# Patient Record
Sex: FEMALE | Race: OTHER | HISPANIC OR LATINO | URBAN - METROPOLITAN AREA
[De-identification: names, ages, dates, MRNs, and addresses within clinical notes are randomized per-mention and may not be internally consistent; named-entity substitution may affect disease eponyms.]

---

## 2019-08-14 ENCOUNTER — EMERGENCY (EMERGENCY)
Facility: HOSPITAL | Age: 39
LOS: 1 days | Discharge: HOME | End: 2019-08-14
Admitting: EMERGENCY MEDICINE
Payer: MEDICAID

## 2019-08-14 VITALS
DIASTOLIC BLOOD PRESSURE: 82 MMHG | RESPIRATION RATE: 16 BRPM | OXYGEN SATURATION: 100 % | SYSTOLIC BLOOD PRESSURE: 113 MMHG | HEART RATE: 81 BPM | TEMPERATURE: 98 F

## 2019-08-14 LAB
ALBUMIN SERPL ELPH-MCNC: 4.4 G/DL — SIGNIFICANT CHANGE UP (ref 3.3–5)
ALP SERPL-CCNC: 77 U/L — SIGNIFICANT CHANGE UP (ref 40–120)
ALT FLD-CCNC: 9 U/L — SIGNIFICANT CHANGE UP (ref 4–33)
ANION GAP SERPL CALC-SCNC: 11 MMO/L — SIGNIFICANT CHANGE UP (ref 7–14)
APPEARANCE UR: CLEAR — SIGNIFICANT CHANGE UP
AST SERPL-CCNC: 18 U/L — SIGNIFICANT CHANGE UP (ref 4–32)
BACTERIA # UR AUTO: NEGATIVE — SIGNIFICANT CHANGE UP
BASOPHILS # BLD AUTO: 0.02 K/UL — SIGNIFICANT CHANGE UP (ref 0–0.2)
BASOPHILS NFR BLD AUTO: 0.3 % — SIGNIFICANT CHANGE UP (ref 0–2)
BILIRUB SERPL-MCNC: 0.8 MG/DL — SIGNIFICANT CHANGE UP (ref 0.2–1.2)
BILIRUB UR-MCNC: NEGATIVE — SIGNIFICANT CHANGE UP
BLD GP AB SCN SERPL QL: NEGATIVE — SIGNIFICANT CHANGE UP
BLOOD UR QL VISUAL: NEGATIVE — SIGNIFICANT CHANGE UP
BUN SERPL-MCNC: 9 MG/DL — SIGNIFICANT CHANGE UP (ref 7–23)
CALCIUM SERPL-MCNC: 9.2 MG/DL — SIGNIFICANT CHANGE UP (ref 8.4–10.5)
CHLORIDE SERPL-SCNC: 102 MMOL/L — SIGNIFICANT CHANGE UP (ref 98–107)
CO2 SERPL-SCNC: 25 MMOL/L — SIGNIFICANT CHANGE UP (ref 22–31)
COLOR SPEC: YELLOW — SIGNIFICANT CHANGE UP
CREAT SERPL-MCNC: 0.51 MG/DL — SIGNIFICANT CHANGE UP (ref 0.5–1.3)
EOSINOPHIL # BLD AUTO: 0.03 K/UL — SIGNIFICANT CHANGE UP (ref 0–0.5)
EOSINOPHIL NFR BLD AUTO: 0.4 % — SIGNIFICANT CHANGE UP (ref 0–6)
GLUCOSE SERPL-MCNC: 113 MG/DL — HIGH (ref 70–99)
GLUCOSE UR-MCNC: NEGATIVE — SIGNIFICANT CHANGE UP
HCG SERPL-ACNC: 910.1 MIU/ML — SIGNIFICANT CHANGE UP
HCT VFR BLD CALC: 42.6 % — SIGNIFICANT CHANGE UP (ref 34.5–45)
HGB BLD-MCNC: 13.7 G/DL — SIGNIFICANT CHANGE UP (ref 11.5–15.5)
HYALINE CASTS # UR AUTO: NEGATIVE — SIGNIFICANT CHANGE UP
IMM GRANULOCYTES NFR BLD AUTO: 0.4 % — SIGNIFICANT CHANGE UP (ref 0–1.5)
KETONES UR-MCNC: NEGATIVE — SIGNIFICANT CHANGE UP
LEUKOCYTE ESTERASE UR-ACNC: NEGATIVE — SIGNIFICANT CHANGE UP
LYMPHOCYTES # BLD AUTO: 2.45 K/UL — SIGNIFICANT CHANGE UP (ref 1–3.3)
LYMPHOCYTES # BLD AUTO: 31.6 % — SIGNIFICANT CHANGE UP (ref 13–44)
MCHC RBC-ENTMCNC: 28.7 PG — SIGNIFICANT CHANGE UP (ref 27–34)
MCHC RBC-ENTMCNC: 32.2 % — SIGNIFICANT CHANGE UP (ref 32–36)
MCV RBC AUTO: 89.1 FL — SIGNIFICANT CHANGE UP (ref 80–100)
MONOCYTES # BLD AUTO: 0.39 K/UL — SIGNIFICANT CHANGE UP (ref 0–0.9)
MONOCYTES NFR BLD AUTO: 5 % — SIGNIFICANT CHANGE UP (ref 2–14)
NEUTROPHILS # BLD AUTO: 4.83 K/UL — SIGNIFICANT CHANGE UP (ref 1.8–7.4)
NEUTROPHILS NFR BLD AUTO: 62.3 % — SIGNIFICANT CHANGE UP (ref 43–77)
NITRITE UR-MCNC: NEGATIVE — SIGNIFICANT CHANGE UP
NRBC # FLD: 0 K/UL — SIGNIFICANT CHANGE UP (ref 0–0)
PH UR: 6 — SIGNIFICANT CHANGE UP (ref 5–8)
PLATELET # BLD AUTO: 297 K/UL — SIGNIFICANT CHANGE UP (ref 150–400)
PMV BLD: 9.9 FL — SIGNIFICANT CHANGE UP (ref 7–13)
POTASSIUM SERPL-MCNC: 3.8 MMOL/L — SIGNIFICANT CHANGE UP (ref 3.5–5.3)
POTASSIUM SERPL-SCNC: 3.8 MMOL/L — SIGNIFICANT CHANGE UP (ref 3.5–5.3)
PROT SERPL-MCNC: 7.7 G/DL — SIGNIFICANT CHANGE UP (ref 6–8.3)
PROT UR-MCNC: 20 — SIGNIFICANT CHANGE UP
RBC # BLD: 4.78 M/UL — SIGNIFICANT CHANGE UP (ref 3.8–5.2)
RBC # FLD: 13 % — SIGNIFICANT CHANGE UP (ref 10.3–14.5)
RBC CASTS # UR COMP ASSIST: SIGNIFICANT CHANGE UP (ref 0–?)
RH IG SCN BLD-IMP: POSITIVE — SIGNIFICANT CHANGE UP
SODIUM SERPL-SCNC: 138 MMOL/L — SIGNIFICANT CHANGE UP (ref 135–145)
SP GR SPEC: 1.03 — SIGNIFICANT CHANGE UP (ref 1–1.04)
SQUAMOUS # UR AUTO: SIGNIFICANT CHANGE UP
UROBILINOGEN FLD QL: NORMAL — SIGNIFICANT CHANGE UP
WBC # BLD: 7.75 K/UL — SIGNIFICANT CHANGE UP (ref 3.8–10.5)
WBC # FLD AUTO: 7.75 K/UL — SIGNIFICANT CHANGE UP (ref 3.8–10.5)
WBC UR QL: SIGNIFICANT CHANGE UP (ref 0–?)

## 2019-08-14 PROCEDURE — 76817 TRANSVAGINAL US OBSTETRIC: CPT | Mod: 26

## 2019-08-14 PROCEDURE — 99285 EMERGENCY DEPT VISIT HI MDM: CPT

## 2019-08-14 NOTE — ED PROVIDER NOTE - CLINICAL SUMMARY MEDICAL DECISION MAKING FREE TEXT BOX
Pt with fetal demise on u/s, os opened. VSS, labs stable. hcg 900. Stable for d/c. Pt with fetal demise on u/s, os opened. VSS, labs stable. hcg 900. Stable for d/c. To f/u with outpatient GYN. Discussed strict return precautions.

## 2019-08-14 NOTE — ED PROVIDER NOTE - OBJECTIVE STATEMENT
Pt is , 9 weeks pregnant by dates c/o lower abdominal pain, lower back pain and mild vaginal spotting x 6 days. Abdominal pain is described as pressure, located to lower abdomen.  Low back pain described as dull ache. No pain radiation. Pt has had mild vaginal spotting, described as light pink, sometimes coffee brown color, which she sees only when she wipes after urinating and is intermittent. She denies seeing any blood in underwear. Pt denies dysuria, urinary frequency, urgency, flank pain, vaginal discharge.  Pt goes to a OB/GYN clinic in [place]- [facility].

## 2019-08-14 NOTE — ED PROVIDER NOTE - NSFOLLOWUPINSTRUCTIONS_ED_ALL_ED_FT
Sigue con el doctor Ginecologo entre nazia semana. Puede ayleen ibuprofen 3 pastillas (600mg) cada 6 horas con comida para dolor.  Puede tener un poco de sangramiento y paso de coagulos de steve por la vagina.  Regresa al hospital si tiene mucho sangramiento vaginal (mas de 1 pad por hora),

## 2019-08-14 NOTE — ED ADULT TRIAGE NOTE - CHIEF COMPLAINT QUOTE
9 weeks pregnant, c/o abdominal cramping since Friday, some vaginal spotting since Thursday, denies PMH, Cypriot interpretor

## 2019-08-14 NOTE — ED ADULT NURSE NOTE - CHIEF COMPLAINT QUOTE
9 weeks pregnant, c/o abdominal cramping since Friday, some vaginal spotting since Thursday, denies PMH, Lithuanian interpretor

## 2019-08-14 NOTE — ED PROVIDER NOTE - NSFOLLOWUPCLINICS_GEN_ALL_ED_FT
[facility]  OB/GYN & Surg  [phone] [place] [address]  Phone: [phone]  Fax:   Follow Up Time:

## 2019-08-14 NOTE — ED ADULT NURSE NOTE - OBJECTIVE STATEMENT
pt received in intake 3, c.o. being 8 weeks pregnant with vaginal bleeding. transport here to take patient to ultrasound. sl placed labs sent. pt to sono via wheelchair then RW.

## 2019-08-15 PROBLEM — Z00.00 ENCOUNTER FOR PREVENTIVE HEALTH EXAMINATION: Status: ACTIVE | Noted: 2019-08-15

## 2019-08-15 PROBLEM — Z78.9 OTHER SPECIFIED HEALTH STATUS: Chronic | Status: ACTIVE | Noted: 2019-08-14

## 2019-08-19 ENCOUNTER — APPOINTMENT (OUTPATIENT)
Dept: OBGYN | Facility: HOSPITAL | Age: 39
End: 2019-08-19
Payer: MEDICAID

## 2019-08-19 ENCOUNTER — OUTPATIENT (OUTPATIENT)
Dept: OUTPATIENT SERVICES | Facility: HOSPITAL | Age: 39
LOS: 1 days | End: 2019-08-19

## 2019-08-19 VITALS
SYSTOLIC BLOOD PRESSURE: 115 MMHG | HEIGHT: 60 IN | WEIGHT: 139 LBS | BODY MASS INDEX: 27.29 KG/M2 | DIASTOLIC BLOOD PRESSURE: 65 MMHG | HEART RATE: 76 BPM

## 2019-08-19 DIAGNOSIS — O02.1 MISSED ABORTION: ICD-10-CM

## 2019-08-19 PROCEDURE — 99213 OFFICE O/P EST LOW 20 MIN: CPT | Mod: GC

## 2019-08-19 RX ORDER — MISOPROSTOL 200 UG/1
200 TABLET ORAL
Qty: 3 | Refills: 0 | Status: ACTIVE | COMMUNITY
Start: 2019-08-19 | End: 1900-01-01

## 2019-08-19 NOTE — PHYSICAL EXAM
[Labia Majora] : labia major [Labia Minora] : labia minora [Normal] : clitoris [Old Blood] : old blood was present in the vagina [Dilated] : the cervix was noted to be dilated

## 2019-08-20 DIAGNOSIS — O02.1 MISSED ABORTION: ICD-10-CM

## 2019-09-04 NOTE — REVIEW OF SYSTEMS
[Pelvic Pain] : pelvic pain [Abn Vag Bleeding] : abnormal vaginal bleeding [Nl] : Neurological [Dysuria] : no dysuria

## 2019-09-18 ENCOUNTER — APPOINTMENT (OUTPATIENT)
Dept: OBGYN | Facility: HOSPITAL | Age: 39
End: 2019-09-18

## 2019-10-14 ENCOUNTER — APPOINTMENT (OUTPATIENT)
Dept: OBGYN | Facility: HOSPITAL | Age: 39
End: 2019-10-14

## 2020-04-11 ENCOUNTER — EMERGENCY (EMERGENCY)
Facility: HOSPITAL | Age: 40
LOS: 1 days | Discharge: HOME | End: 2020-04-11
Admitting: EMERGENCY MEDICINE
Payer: MEDICAID

## 2020-04-11 VITALS
TEMPERATURE: 98 F | RESPIRATION RATE: 100 BRPM | HEART RATE: 82 BPM | SYSTOLIC BLOOD PRESSURE: 117 MMHG | DIASTOLIC BLOOD PRESSURE: 63 MMHG | OXYGEN SATURATION: 100 %

## 2020-04-11 VITALS
RESPIRATION RATE: 17 BRPM | SYSTOLIC BLOOD PRESSURE: 99 MMHG | DIASTOLIC BLOOD PRESSURE: 57 MMHG | OXYGEN SATURATION: 99 % | HEART RATE: 78 BPM | TEMPERATURE: 98 F

## 2020-04-11 LAB
ALBUMIN SERPL ELPH-MCNC: 3.5 G/DL — SIGNIFICANT CHANGE UP (ref 3.3–5)
ALP SERPL-CCNC: 64 U/L — SIGNIFICANT CHANGE UP (ref 40–120)
ALT FLD-CCNC: 8 U/L — SIGNIFICANT CHANGE UP (ref 4–33)
ANION GAP SERPL CALC-SCNC: 14 MMO/L — SIGNIFICANT CHANGE UP (ref 7–14)
APPEARANCE UR: CLEAR — SIGNIFICANT CHANGE UP
AST SERPL-CCNC: 16 U/L — SIGNIFICANT CHANGE UP (ref 4–32)
BASOPHILS # BLD AUTO: 0.01 K/UL — SIGNIFICANT CHANGE UP (ref 0–0.2)
BASOPHILS NFR BLD AUTO: 0.1 % — SIGNIFICANT CHANGE UP (ref 0–2)
BILIRUB SERPL-MCNC: 0.4 MG/DL — SIGNIFICANT CHANGE UP (ref 0.2–1.2)
BILIRUB UR-MCNC: NEGATIVE — SIGNIFICANT CHANGE UP
BLOOD UR QL VISUAL: NEGATIVE — SIGNIFICANT CHANGE UP
BUN SERPL-MCNC: 6 MG/DL — LOW (ref 7–23)
CALCIUM SERPL-MCNC: 8.6 MG/DL — SIGNIFICANT CHANGE UP (ref 8.4–10.5)
CHLORIDE SERPL-SCNC: 104 MMOL/L — SIGNIFICANT CHANGE UP (ref 98–107)
CO2 SERPL-SCNC: 18 MMOL/L — LOW (ref 22–31)
COLOR SPEC: SIGNIFICANT CHANGE UP
CREAT SERPL-MCNC: 0.43 MG/DL — LOW (ref 0.5–1.3)
D DIMER BLD IA.RAPID-MCNC: 723 NG/ML — SIGNIFICANT CHANGE UP
EOSINOPHIL # BLD AUTO: 0.04 K/UL — SIGNIFICANT CHANGE UP (ref 0–0.5)
EOSINOPHIL NFR BLD AUTO: 0.5 % — SIGNIFICANT CHANGE UP (ref 0–6)
GLUCOSE SERPL-MCNC: 95 MG/DL — SIGNIFICANT CHANGE UP (ref 70–99)
GLUCOSE UR-MCNC: NEGATIVE — SIGNIFICANT CHANGE UP
HCG SERPL-ACNC: SIGNIFICANT CHANGE UP MIU/ML
HCT VFR BLD CALC: 30.1 % — LOW (ref 34.5–45)
HGB BLD-MCNC: 10.1 G/DL — LOW (ref 11.5–15.5)
IMM GRANULOCYTES NFR BLD AUTO: 1.2 % — SIGNIFICANT CHANGE UP (ref 0–1.5)
KETONES UR-MCNC: NEGATIVE — SIGNIFICANT CHANGE UP
LEUKOCYTE ESTERASE UR-ACNC: NEGATIVE — SIGNIFICANT CHANGE UP
LIDOCAIN IGE QN: 32.3 U/L — SIGNIFICANT CHANGE UP (ref 7–60)
LYMPHOCYTES # BLD AUTO: 1.94 K/UL — SIGNIFICANT CHANGE UP (ref 1–3.3)
LYMPHOCYTES # BLD AUTO: 23.6 % — SIGNIFICANT CHANGE UP (ref 13–44)
MCHC RBC-ENTMCNC: 29.1 PG — SIGNIFICANT CHANGE UP (ref 27–34)
MCHC RBC-ENTMCNC: 33.6 % — SIGNIFICANT CHANGE UP (ref 32–36)
MCV RBC AUTO: 86.7 FL — SIGNIFICANT CHANGE UP (ref 80–100)
MONOCYTES # BLD AUTO: 0.56 K/UL — SIGNIFICANT CHANGE UP (ref 0–0.9)
MONOCYTES NFR BLD AUTO: 6.8 % — SIGNIFICANT CHANGE UP (ref 2–14)
NEUTROPHILS # BLD AUTO: 5.57 K/UL — SIGNIFICANT CHANGE UP (ref 1.8–7.4)
NEUTROPHILS NFR BLD AUTO: 67.8 % — SIGNIFICANT CHANGE UP (ref 43–77)
NITRITE UR-MCNC: NEGATIVE — SIGNIFICANT CHANGE UP
NRBC # FLD: 0 K/UL — SIGNIFICANT CHANGE UP (ref 0–0)
PH UR: 6.5 — SIGNIFICANT CHANGE UP (ref 5–8)
PLATELET # BLD AUTO: 231 K/UL — SIGNIFICANT CHANGE UP (ref 150–400)
PMV BLD: 10.1 FL — SIGNIFICANT CHANGE UP (ref 7–13)
POTASSIUM SERPL-MCNC: 3.7 MMOL/L — SIGNIFICANT CHANGE UP (ref 3.5–5.3)
POTASSIUM SERPL-SCNC: 3.7 MMOL/L — SIGNIFICANT CHANGE UP (ref 3.5–5.3)
PROT SERPL-MCNC: 6.6 G/DL — SIGNIFICANT CHANGE UP (ref 6–8.3)
PROT UR-MCNC: NEGATIVE — SIGNIFICANT CHANGE UP
RBC # BLD: 3.47 M/UL — LOW (ref 3.8–5.2)
RBC # FLD: 13.7 % — SIGNIFICANT CHANGE UP (ref 10.3–14.5)
SODIUM SERPL-SCNC: 136 MMOL/L — SIGNIFICANT CHANGE UP (ref 135–145)
SP GR SPEC: 1.01 — SIGNIFICANT CHANGE UP (ref 1–1.04)
TROPONIN T, HIGH SENSITIVITY: < 6 NG/L — SIGNIFICANT CHANGE UP (ref ?–14)
UROBILINOGEN FLD QL: NORMAL — SIGNIFICANT CHANGE UP
WBC # BLD: 8.22 K/UL — SIGNIFICANT CHANGE UP (ref 3.8–10.5)
WBC # FLD AUTO: 8.22 K/UL — SIGNIFICANT CHANGE UP (ref 3.8–10.5)

## 2020-04-11 PROCEDURE — 71046 X-RAY EXAM CHEST 2 VIEWS: CPT | Mod: 26

## 2020-04-11 PROCEDURE — 93010 ELECTROCARDIOGRAM REPORT: CPT

## 2020-04-11 PROCEDURE — 99284 EMERGENCY DEPT VISIT MOD MDM: CPT | Mod: 25

## 2020-04-11 RX ORDER — ACETAMINOPHEN 500 MG
975 TABLET ORAL ONCE
Refills: 0 | Status: COMPLETED | OUTPATIENT
Start: 2020-04-11 | End: 2020-04-11

## 2020-04-11 RX ADMIN — Medication 975 MILLIGRAM(S): at 10:47

## 2020-04-11 NOTE — ED PROVIDER NOTE - CLINICAL SUMMARY MEDICAL DECISION MAKING FREE TEXT BOX
40 y/o F currently 5 months pregnant presenting with left sided chest pain associated with SOB, pleuritic in nature. patient well appearing, vitals stable. EKG without ischemic changes, symptoms concerning for PE. plan for routine labs, including trop and d-dimer. 40 y/o F currently 5 months pregnant presenting with left sided chest pain associated with SOB, pleuritic in nature. patient well appearing, vitals stable. EKG without ischemic changes, symptoms concerning for PE. plan for routine labs, including trop and d-dimer. Bedside fetal .

## 2020-04-11 NOTE — ED PROVIDER NOTE - PATIENT PORTAL LINK FT
You can access the FollowMyHealth Patient Portal offered by [facility] by registering at the following website: [email or web address]. By joining [facility]’s FollowMyHealth portal, you will also be able to view your health information using other applications (apps) compatible with our system.

## 2020-04-11 NOTE — ED ADULT TRIAGE NOTE - CHIEF COMPLAINT QUOTE
Pt primarily Moroccan speaking  ID [id number]: Pt c/o epigastric pain radiating to her back x3 days. Pt approximately 5 months pregnant JOSE 7/23. Pt denies any vaginal discharge or bleeding, denies sob, fevers. No distress noted. To be seen in ED as per L&D. Pt primarily Tajik speaking  ID [id number]: Pt c/o "epigastric pain radiating to left side of check and back x3 days". Pt approximately 5 months pregnant JOSE 7/23. Pt denies any vaginal discharge or bleeding, denies sob, fevers. No distress noted. To be seen in ED as per L&D. Pt primarily Bruneian speaking  ID [id number]: Pt c/o "epigastric pain radiating to left side of chest and back x3 days". Pt approximately 5 months pregnant JOSE 7/23. Denies any vaginal discharge or bleeding, denies sob, fevers. No distress noted. To be seen in ED as per L&D.

## 2020-04-11 NOTE — ED PROVIDER NOTE - NSFOLLOWUPINSTRUCTIONS_ED_ALL_ED_FT
Follow up with your PMD within 48-72 hours.     Take tylenol 650mg every 6 hours for pain or temp greater than 99.9.     Worsening or continued chest pain, shortness of breath, fainting, palpitations, or new concerning symptoms return to Emergency Department.

## 2020-04-11 NOTE — ED ADULT NURSE REASSESSMENT NOTE - NS ED NURSE REASSESS COMMENT FT1
pt c/o left sided chest pain with radiation to back X2 days, pt states she also has intermittent shortness of breath. denies n/v/d/fever. no recent illness. pt is well appearing in bed, breathing even and unlabored, alert and oriented, NAD, skin color normal for race, warm and dry. will continue to monitor.

## 2020-04-11 NOTE — ED PROVIDER NOTE - PROGRESS NOTE DETAILS
MARCELINA Marie- received sign out from MARCELINA Ivy pending cxr and labs. dimer+. as per radiology, cxr clear with no findings. spoke with patient w/  ID# [id number] and understands risk of CTA w/ iv contrast given pregnant and benefit of ruling out PE. pt consents for CTA. MARCELINA Marie- interperter ID# [id number]: Pt requesting to leave ER and does not want to perform CTA, her  made her change her mind. States she is feeling better and will follow up with her PCP this week if it's necessary. Risks of leaving have been discussed  such as misdiagnosis, worsening illness leading up to and including prolonged or permanent disability or death, discussed concern for pulmonary embolism. Pt refusing further evaluation, treatment, or admission at this time. They appear clinically sober, AxOx3, free from distracting injury, appear to have intact insight, judgement, and reason and in my opinion have the capacity to make this decision. The patient was able to verbally state back in a coherent manner their current medical condition/current diagnosis, the proposes course of treatment, and the risks, benefits, and alternatives of treatment versus leaving against medical advice. They understand that they may return to seek medical attention here at whatever time they want. I highly advised them to return to the ED immediately if they experienced any new or worsening symptoms, reconsider treatment or had any other concerns. This would be without any repercussions.

## 2020-04-11 NOTE — ED PROVIDER NOTE - OBJECTIVE STATEMENT
patient is a 38 y/o F with no sig pmhx currently 5 months pregnant presenting with left sided chest pain that radiates to her back associated with sob, that is aggravated when laying supine and upon deep inspirations. She denies trauma, strenous activity, URI, flu like symptoms, illicit drug use prior to the onset of symptoms. He additionally denies fever, chills, abdominal pain, vomiting, urinary symptoms, vaginal bleeding

## 2020-10-30 NOTE — ED ADULT TRIAGE NOTE - PATIENT/CAREGIVER ACCEPTED INTERPRETER SERVICES
[facility] EMERGENCY DEPARTMENT ([facility])  October 30, 2020  History     Chief Complaint   Patient presents with     Abdominal Pain     Vaginal Bleeding     HPI  Carmenza Blackwood is a 22 year old female with a PMH of PCOS, Tiana-Danlos syndrome, POTS, and migraines who presents to the ED today complaining of an IUD problem. Patient reports that she had an IUD placed 3 days ago and has had vaginal bleeding and pelvic cramping since then. Bleeding got somewhat better today but pelvic pain got worse. Called insurance nurse line who recommended she come to ED for evaluation. Denies dysuria, fevers, chills, nausea, or vomiting.     I have reviewed the Medications, Allergies, Past Medical and Surgical History, and Social History in the [employer] system.  PAST MEDICAL HISTORY:   Past Medical History:   Diagnosis Date     Anxiety      Depressive disorder      Tiana-Danlos syndrome      Migraines      PCOS (polycystic ovarian syndrome)      POTS (postural orthostatic tachycardia syndrome)        PAST SURGICAL HISTORY:   Past Surgical History:   Procedure Laterality Date     APPENDECTOMY  12/2017     ENT SURGERY         Past medical history, past surgical history, medications, and allergies were reviewed with the patient. Additional pertinent items: None    FAMILY HISTORY:   Family History   Problem Relation Age of Onset     Breast Cancer Other      Anxiety Disorder Other         Maternal Great Grandma     Depression Other      Other Cancer Maternal Grandfather      Anxiety Disorder Mother      Depression Mother      Genetic Disorder Mother      Anesthesia Reaction Mother         Does not work on her very well     Anxiety Disorder Brother      Mental Illness Brother      Depression Brother      Genetic Disorder Brother      Mental Illness Father      Depression Maternal Grandmother      Genetic Disorder Maternal Grandmother      Obesity Paternal Grandmother         Entire Paternal Side       SOCIAL HISTORY:   Social  "History     Tobacco Use     Smoking status: Never Smoker     Smokeless tobacco: Never Used   Substance Use Topics     Alcohol use: Not Currently     Social history was reviewed with the patient. Additional pertinent items: None      Patient's Medications   New Prescriptions    No medications on file   Previous Medications    LEVONORGESTREL (MIRENA) 20 MCG/24HR IUD    1 each (20 mcg) by Intrauterine route once    PROPRANOLOL (INDERAL) 20 MG TABLET    Take 20 mg by mouth 2 times daily    SERTRALINE (ZOLOFT) 50 MG TABLET    TAKE 1/2 TABlet BY mouth EVERY NIGHT FOR 1 WEEK THEN INCREASE TO 1 TABlet EVERY NIGHT    SPIRONOLACTONE (ALDACTONE) 50 MG TABLET    Take 1 tablet (50 mg) by mouth daily    SUMATRIPTAN (IMITREX) 100 MG TABLET       Modified Medications    No medications on file   Discontinued Medications    No medications on file          Allergies   Allergen Reactions     Gluten Meal Cramps, Diarrhea and GI Disturbance     Lactose Cramps, Diarrhea and GI Disturbance     Nuvaring Itching and Swelling     Adhesive Tape Hives, Itching and Rash        Review of Systems  A complete review of systems was performed with pertinent positives and negatives noted in the HPI, and all other systems negative.    Physical Exam   BP: (!) 133/92  Pulse: 64  Temp: 98.1  F (36.7  C)  Resp: 16  Height: 167.6 cm (5' 6\")  Weight: 99.8 kg (220 lb)  SpO2: 98 %      Physical Exam  Vitals signs and nursing note reviewed. Exam conducted with a chaperone present.   Constitutional:       General: She is not in acute distress.     Appearance: She is well-developed. She is not diaphoretic.   HENT:      Head: Normocephalic and atraumatic.      Mouth/Throat:      Pharynx: No oropharyngeal exudate.   Eyes:      General: No scleral icterus.     Pupils: Pupils are equal, round, and reactive to light.   Cardiovascular:      Rate and Rhythm: Normal rate and regular rhythm.      Heart sounds: Normal heart sounds.   Pulmonary:      Effort: No respiratory " distress.      Breath sounds: Normal breath sounds.   Abdominal:      General: Bowel sounds are normal.      Palpations: Abdomen is soft.      Tenderness: There is no abdominal tenderness.   Genitourinary:     General: Normal vulva.      Vagina: Vaginal discharge (thick, white) present. No bleeding.      Cervix: Normal.      Uterus: Not tender.       Adnexa:         Right: No tenderness or fullness.          Left: No tenderness or fullness.        Comments: IUD strings noted with evidence of dislodgment.   Musculoskeletal:         General: No tenderness.   Skin:     General: Skin is warm.      Capillary Refill: Capillary refill takes less than 2 seconds.      Findings: No rash.   Neurological:      General: No focal deficit present.      Mental Status: She is alert and oriented to person, place, and time.         ED Course        Procedures      Results for orders placed or performed during the hospital encounter of 10/30/20 (from the past 24 hour(s))   US Pelvic Complete with Transvaginal    Impression    Impression:   Unremarkable pelvic ultrasound. IUD is in a satisfactory location.   Wet prep    Specimen: Vagina   Result Value Ref Range    Specimen Description Vagina     Wet Prep No Trichomonas seen     Wet Prep No clue cells seen     Wet Prep No yeast seen     Wet Prep Few  PMNs seen        Medications - No data to display          Assessments & Plan (with Medical Decision Making)   Patient was seen and examined. Pelvic exam shows good placement of IUD strings, no bleeding or tenderness to palpation. Ultrasound shows adequate placement of IUD in uterus, no other abnormalities. Wet prep negative. Discussed findings with patient. Will discharge at this time. Follow up with OB/GYN if symptoms persist. Patient comfortable with this plan. Discharge home.     I have reviewed the nursing notes.    I have reviewed the findings, diagnosis, plan and need for follow up with the patient.    New Prescriptions    No  medications on file       Final diagnoses:   Pelvic pain in female   IUD (intrauterine device) in place       10/30/2020   [facility] EMERGENCY DEPARTMENT     Reanna Yin DO  10/31/20 0203     yes

## 2021-06-14 NOTE — ED ADULT TRIAGE NOTE - INTERPRETER'S NAME
Radha Plan: Patient to apply naftifine 2 % topical cream to both feet twice daily for one month. Discussed with patient to apply cream for the full month. Recommended patient apply Zersorb AF powder to both feet daily. Patient is aware to moisturize and apply sunscreen on a daily basis. Patient is aware to contact the office if she has any questions, concerns or problems. Detail Level: Zone

## 2022-05-01 NOTE — ED ADULT NURSE NOTE - CAS TRG GENERAL AIRWAY, MLM
RN to bedside to administer ordered medications to patient. Pt requesting IM administration of benadryl. RN discussed route with MD, PO med orders remain active. Pt informed that meds are ordered PO, pt states, \"that's okay, I don't need any medicine. Can I just have a sandwich, a narcan kit, and discharge papers? \" MD informed and is agreeable, narcan kit order to be placed.      Blima Severance, RN  05/01/22 [id number]

## 2022-06-21 ENCOUNTER — RESULT REVIEW (OUTPATIENT)
Age: 42
End: 2022-06-21

## 2022-10-27 NOTE — ED PROVIDER NOTE - NSTIMEPROVIDERCAREINITIATE_GEN_ER
Refill approved for 1 month.  Christa Porter needs to be seen for an appointment with PCP before further refills are given.     11-Apr-2020 04:51

## 2024-08-26 NOTE — ED PROVIDER NOTE - NS ED MD EM SELECTION
[id number] Comprehensive
Bed/Stretcher in lowest position, wheels locked, appropriate side rails in place/Call bell, personal items and telephone in reach/Instruct patient to call for assistance before getting out of bed/chair/stretcher/Non-slip footwear applied when patient is off stretcher/[place] to call system/Physically safe environment - no spills, clutter or unnecessary equipment/Purposeful proactive rounding/Room/bathroom lighting operational, light cord in reach